# Patient Record
(demographics unavailable — no encounter records)

---

## 2025-04-24 NOTE — HISTORY OF PRESENT ILLNESS
[FreeTextEntry1] : Pt is here for follow up of multiple medical problems including  anxiety, elevated blood pressure,smoker.  hyperlipidemia, HTN [de-identified] : She related  she has  had  ankle swelling and   ?? tured over her ankle. Relates she had  rash in [place] went to urgent care and it  resolved.

## 2025-07-17 NOTE — HEALTH RISK ASSESSMENT
[Yes] : Yes [2 - 4 times a month (2 pts)] : 2-4 times a month (2 points) [1 or 2 (0 pts)] : 1 or 2 (0 points) [Never (0 pts)] : Never (0 points) [Current] : Current [No falls in past year] : Patient reported no falls in the past year [0] : 1) Little interest or pleasure doing things: Not at all (0) [1] : 2) Feeling down, depressed, or hopeless for several days (1) [PHQ-2 Negative - No further assessment needed] : PHQ-2 Negative - No further assessment needed [No] : does not take [None] : None [With Family] : lives with family [Single] : single [Fully functional (bathing, dressing, toileting, transferring, walking, feeding)] : Fully functional (bathing, dressing, toileting, transferring, walking, feeding) [Fully functional (using the telephone, shopping, preparing meals, housekeeping, doing laundry, using] : Fully functional and needs no help or supervision to perform IADLs (using the telephone, shopping, preparing meals, housekeeping, doing laundry, using transportation, managing medications and managing finances) [Smoke Detector] : smoke detector [Carbon Monoxide Detector] : carbon monoxide detector [Seat Belt] :  uses seat belt [Sunscreen] : uses sunscreen [With Patient/Caregiver] : , with patient/caregiver [de-identified] : no [de-identified] : reg [[id number]] : 1 [de-identified] : cigarettes daily  [EyeExamDate] : 01/2023 [Change in mental status noted] : No change in mental status noted [Reports changes in hearing] : Reports no changes in hearing [MammogramComments] : n/a  [BoneDensityComments] : never  [ColonoscopyComments] : never  [AdvancecareDate] : 7/17/25

## 2025-07-17 NOTE — ASSESSMENT
[FreeTextEntry1] : Appropriate labs were drawn in this office today. All preventative measures were reviewed with the patient and the patient is due for and agrees to the following as outlined  in the plan  below. advised prevnar, rsv, flu and shingrx [Vaccines Reviewed] : Immunizations reviewed today. Please see immunization details in the vaccine log within the immunization flowsheet.